# Patient Record
Sex: FEMALE | Race: OTHER | HISPANIC OR LATINO | Employment: UNEMPLOYED | URBAN - METROPOLITAN AREA
[De-identification: names, ages, dates, MRNs, and addresses within clinical notes are randomized per-mention and may not be internally consistent; named-entity substitution may affect disease eponyms.]

---

## 2020-11-23 ENCOUNTER — HOSPITAL ENCOUNTER (EMERGENCY)
Facility: HOSPITAL | Age: 45
Discharge: HOME | End: 2020-11-23
Attending: EMERGENCY MEDICINE
Payer: COMMERCIAL

## 2020-11-23 VITALS
OXYGEN SATURATION: 99 % | WEIGHT: 175 LBS | BODY MASS INDEX: 25.92 KG/M2 | HEIGHT: 69 IN | SYSTOLIC BLOOD PRESSURE: 138 MMHG | DIASTOLIC BLOOD PRESSURE: 79 MMHG | TEMPERATURE: 98.7 F | HEART RATE: 95 BPM | RESPIRATION RATE: 18 BRPM

## 2020-11-23 DIAGNOSIS — T78.40XA ALLERGIC REACTION, INITIAL ENCOUNTER: Primary | ICD-10-CM

## 2020-11-23 PROCEDURE — 99284 EMERGENCY DEPT VISIT MOD MDM: CPT | Performed by: PHYSICIAN ASSISTANT

## 2020-11-23 PROCEDURE — 99283 EMERGENCY DEPT VISIT LOW MDM: CPT

## 2020-11-23 RX ORDER — PREDNISONE 50 MG/1
50 TABLET ORAL DAILY
Qty: 4 TABLET | Refills: 0 | Status: SHIPPED | OUTPATIENT
Start: 2020-11-24 | End: 2020-11-28

## 2020-11-23 RX ORDER — DIPHENHYDRAMINE HCL 25 MG
50 TABLET ORAL ONCE
Status: COMPLETED | OUTPATIENT
Start: 2020-11-23 | End: 2020-11-23

## 2020-11-23 RX ORDER — FAMOTIDINE 20 MG/1
20 TABLET, FILM COATED ORAL ONCE
Status: COMPLETED | OUTPATIENT
Start: 2020-11-23 | End: 2020-11-23

## 2020-11-23 RX ADMIN — FAMOTIDINE 20 MG: 20 TABLET, FILM COATED ORAL at 11:12

## 2020-11-23 RX ADMIN — PREDNISONE 50 MG: 20 TABLET ORAL at 11:11

## 2020-11-23 RX ADMIN — DIPHENHYDRAMINE HCL 50 MG: 25 TABLET, COATED ORAL at 11:11

## 2024-04-03 ENCOUNTER — HOSPITAL ENCOUNTER (EMERGENCY)
Facility: HOSPITAL | Age: 49
Discharge: HOME | End: 2024-04-03
Attending: EMERGENCY MEDICINE
Payer: COMMERCIAL

## 2024-04-03 VITALS
DIASTOLIC BLOOD PRESSURE: 83 MMHG | RESPIRATION RATE: 20 BRPM | TEMPERATURE: 97.4 F | OXYGEN SATURATION: 98 % | HEART RATE: 68 BPM | SYSTOLIC BLOOD PRESSURE: 160 MMHG

## 2024-04-03 DIAGNOSIS — K05.10 GINGIVITIS: Primary | ICD-10-CM

## 2024-04-03 PROCEDURE — 99282 EMERGENCY DEPT VISIT SF MDM: CPT

## 2024-04-03 PROCEDURE — 99284 EMERGENCY DEPT VISIT MOD MDM: CPT | Performed by: EMERGENCY MEDICINE

## 2024-04-03 RX ORDER — PENICILLIN V POTASSIUM 500 MG/1
500 TABLET ORAL EVERY 6 HOURS SCHEDULED
Qty: 28 TABLET | Refills: 0 | Status: SHIPPED | OUTPATIENT
Start: 2024-04-03 | End: 2024-04-10

## 2024-04-03 RX ORDER — METHYLPREDNISOLONE 4 MG/1
TABLET ORAL
Qty: 21 TABLET | Refills: 0 | Status: SHIPPED | OUTPATIENT
Start: 2024-04-03

## 2024-04-03 RX ORDER — IBUPROFEN 800 MG/1
800 TABLET ORAL EVERY 6 HOURS PRN
Qty: 30 TABLET | Refills: 0 | Status: SHIPPED | OUTPATIENT
Start: 2024-04-03 | End: 2024-04-03

## 2024-04-03 RX ORDER — PENICILLIN V POTASSIUM 500 MG/1
500 TABLET ORAL EVERY 6 HOURS SCHEDULED
Qty: 28 TABLET | Refills: 0 | Status: SHIPPED | OUTPATIENT
Start: 2024-04-03 | End: 2024-04-03

## 2024-04-03 RX ORDER — METHYLPREDNISOLONE 4 MG/1
TABLET ORAL
Qty: 21 TABLET | Refills: 0 | Status: SHIPPED | OUTPATIENT
Start: 2024-04-03 | End: 2024-04-03

## 2024-04-03 RX ORDER — IBUPROFEN 800 MG/1
800 TABLET ORAL EVERY 6 HOURS PRN
Qty: 30 TABLET | Refills: 0 | Status: SHIPPED | OUTPATIENT
Start: 2024-04-03

## 2024-04-03 NOTE — ED PROVIDER NOTES
History  Chief Complaint   Patient presents with    Dental Pain     Left sided dental pain for the past 4 days. Unable to get in with dentist.      Patient is a 48-year-old female.  She presents to the emergency room with a 4-day history left sided gingival pain.  She has had this pain before and was told it might be lichen planus.  No facial swelling.  No fever or chills.  No difficulty breathing or swallowing.        None       History reviewed. No pertinent past medical history.    History reviewed. No pertinent surgical history.    History reviewed. No pertinent family history.  I have reviewed and agree with the history as documented.    E-Cigarette/Vaping     E-Cigarette/Vaping Substances     Social History     Tobacco Use    Smoking status: Never    Smokeless tobacco: Never   Substance Use Topics    Alcohol use: Not Currently    Drug use: Never       Review of Systems   Constitutional:  Negative for chills and fever.   HENT:  Negative for drooling, facial swelling, sore throat, trouble swallowing and voice change.    Respiratory:  Negative for shortness of breath and stridor.    All other systems reviewed and are negative.      Physical Exam  Physical Exam  Vitals reviewed.   Constitutional:       General: She is not in acute distress.  HENT:      Head: Normocephalic and atraumatic.      Nose: Nose normal.      Mouth/Throat:      Mouth: Mucous membranes are moist.      Comments: No dental tenderness.  There is tenderness to the gingiva.  I did not note any lesions to the mucosal surfaces.  Eyes:      General:         Right eye: No discharge.         Left eye: No discharge.      Conjunctiva/sclera: Conjunctivae normal.   Pulmonary:      Effort: Pulmonary effort is normal. No respiratory distress.   Musculoskeletal:         General: No deformity or signs of injury.      Cervical back: Normal range of motion and neck supple.   Skin:     General: Skin is warm and dry.      Findings: No rash.   Neurological:       General: No focal deficit present.      Mental Status: She is alert and oriented to person, place, and time.   Psychiatric:         Mood and Affect: Mood normal.         Behavior: Behavior normal.         Vital Signs  ED Triage Vitals [04/03/24 1110]   Temperature Pulse Respirations Blood Pressure SpO2   (!) 97.4 °F (36.3 °C) 68 20 160/83 98 %      Temp Source Heart Rate Source Patient Position - Orthostatic VS BP Location FiO2 (%)   Temporal Monitor Sitting Left arm --      Pain Score       --           Vitals:    04/03/24 1110   BP: 160/83   Pulse: 68   Patient Position - Orthostatic VS: Sitting         Visual Acuity      ED Medications  Medications - No data to display    Diagnostic Studies  Results Reviewed       None                   No orders to display              Procedures  Procedures         ED Course                                             Medical Decision Making  There is no Ludewig's angina or obvious dental abscess.  Suspect this is some sort of gingivitis or also possibly lichen planus.  Will treat with steroid and antibiotics.  Will provide analgesia.  Will recommend follow-up with oral surgery.    Risk  Prescription drug management.  Decision regarding hospitalization.             Disposition  Final diagnoses:   Gingivitis     Time reflects when diagnosis was documented in both MDM as applicable and the Disposition within this note       Time User Action Codes Description Comment    4/3/2024 11:39 AM Laurent Franco Add [K05.10] Gingivitis           ED Disposition       ED Disposition   Discharge    Condition   Stable    Date/Time   Wed Apr 3, 2024 11:38 AM    Comment   Ирина Franco discharge to home/self care.                   Follow-up Information       Follow up With Specialties Details Why Contact Info    [facility]  In 1 week  [address] [address]  [phone]            Patient's Medications    Discharge Prescriptions    IBUPROFEN (MOTRIN) 800 MG TABLET    Take 1 tablet (800 mg total) by mouth every 6 (six) hours as needed (pain)       Start Date: 4/3/2024  End Date: --       Order Dose: 800 mg       Quantity: 30 tablet    Refills: 0    METHYLPREDNISOLONE 4 MG TABLET THERAPY PACK    Use as directed on package       Start Date: 4/3/2024  End Date: --       Order Dose: --       Quantity: 21 tablet    Refills: 0    PENICILLIN V POTASSIUM (VEETID) 500 MG TABLET    Take 1 tablet (500 mg total) by mouth every 6 (six) hours for 7 days       Start Date: 4/3/2024  End Date: 4/10/2024       Order Dose: 500 mg       Quantity: 28 tablet    Refills: 0       No discharge procedures on file.    PDMP Review       None            ED Provider  Electronically Signed by             Laurent Franco MD  04/03/24 6287

## 2024-10-30 ENCOUNTER — HOSPITAL ENCOUNTER (EMERGENCY)
Facility: HOSPITAL | Age: 49
Discharge: HOME | End: 2024-10-30
Payer: COMMERCIAL

## 2024-10-30 VITALS
HEART RATE: 90 BPM | SYSTOLIC BLOOD PRESSURE: 134 MMHG | OXYGEN SATURATION: 99 % | TEMPERATURE: 98.7 F | DIASTOLIC BLOOD PRESSURE: 76 MMHG | RESPIRATION RATE: 20 BRPM

## 2024-10-30 DIAGNOSIS — J02.9 ACUTE PHARYNGITIS: ICD-10-CM

## 2024-10-30 DIAGNOSIS — J01.90 ACUTE RHINOSINUSITIS: Primary | ICD-10-CM

## 2024-10-30 PROCEDURE — 99282 EMERGENCY DEPT VISIT SF MDM: CPT

## 2024-10-30 PROCEDURE — 99284 EMERGENCY DEPT VISIT MOD MDM: CPT | Performed by: PHYSICIAN ASSISTANT

## 2024-10-30 RX ORDER — BROMPHENIRAMINE MALEATE, PSEUDOEPHEDRINE HYDROCHLORIDE, AND DEXTROMETHORPHAN HYDROBROMIDE 2; 30; 10 MG/5ML; MG/5ML; MG/5ML
5 SYRUP ORAL 4 TIMES DAILY PRN
Qty: 120 ML | Refills: 0 | Status: SHIPPED | OUTPATIENT
Start: 2024-10-30

## 2024-10-30 RX ORDER — IBUPROFEN 400 MG/1
400 TABLET, FILM COATED ORAL EVERY 6 HOURS PRN
Qty: 30 TABLET | Refills: 0 | Status: SHIPPED | OUTPATIENT
Start: 2024-10-30

## 2024-10-30 NOTE — ED PROVIDER NOTES
"Time reflects when diagnosis was documented in both MDM as applicable and the Disposition within this note       Time User Action Codes Description Comment    10/30/2024 10:36 AM Nolberto Dash Add [J01.90] Acute rhinosinusitis     10/30/2024 10:36 AM Nolberto Dash [J02.9] Acute pharyngitis           ED Disposition       ED Disposition   Discharge    Condition   Stable    Date/Time   Wed Oct 30, 2024 10:36 AM    Comment   Ирина A Salvador discharge to home/self care.                   Assessment & Plan       Medical Decision Making  Medical Decision Makin y.o. female presents to ED for evaluation of sinus pressure, headache and congestion.     Ddx:  ddx includes but is not limited to viral syndrome, sinusitis, tension headache, migraine headache, cluster headache, consider but doubt SAH, SDH, temporal arteritis due to lack of unilateral temporal location of pain, or meningitis due to lack of fever/nuchal rigidity.    Plan.  Offered viral screening but declined,  discussed options with patient who reports she came in today to get treatment for clinical symptoms.     On re-evaluation: well appearing in NAD, vital signs are normal. A&O x 3, airway patent, no chest pain or respiratory distress, Abdomen non distended, non tender. M/S no gross deformity, LIZARRAGA x 4,GCS 15     Final Evaluation:  (see ED course for additional MDM)  Acute rhino sinusitis  Acute pharyngitis    Will treat for sinusitis and presumptive group A strep.  Patient does not exhibit any unusual or severe clinical findings such as secretions, drooling, dysphonia, muffled \"hot potato\" voice, difficulty breathing, stridor or neck swelling that would suggest more severe parapharyngeal space infections.     -Stable for discharge and outpatient management.   -Reviewed diagnosis, treatment plan, and expectant coarse  -Verbal and written instructions given to follow up with pcp and recommended specialist    -Discussed reasons to Return to ED.  " Patient verbalized understanding of reasons return to the ED.   -Opportunity provided for questions and all questions were answered.          Risk  Prescription drug management.             Medications - No data to display    ED Risk Strat Scores                                               History of Present Illness       Chief Complaint   Patient presents with    Headache     Pt presents with c/o HA since Sunday, taking OTC tylenol witn no relief. Reports a previous history but has not had one since 2016.        History reviewed. No pertinent past medical history.   History reviewed. No pertinent surgical history.   History reviewed. No pertinent family history.   Social History     Tobacco Use    Smoking status: Never    Smokeless tobacco: Never   Substance Use Topics    Alcohol use: Not Currently    Drug use: Never      E-Cigarette/Vaping      E-Cigarette/Vaping Substances      I have reviewed and agree with the history as documented.     48-year-old female with no significant past medical history presents to the emergency department for evaluation of headache associated with nasal congestion, sinus pressure, sore throat and cough that started 3 days ago.  Denies recent sick contacts or travel outside the country.  Describes headache as diffuse, pressure and waxing and waning for the past 3 days.  Denies fall injury or trauma.  Reports no blood thinner use.  No history of migraine headaches.  Non-smoker.  Denies any facial droop fevers, neck pain, rash, chest pain, shortness of breath, nausea, vomiting, extremity weakness or syncope.  Has been taking over-the-counter Tylenol without relief of symptoms.      History provided by:  Patient   used: No    Headache  Associated symptoms: congestion, drainage, sinus pressure and sore throat    Associated symptoms: no abdominal pain, no diarrhea, no fever, no nausea, no neck pain, no neck stiffness, no numbness, no seizures, no vomiting and no  weakness        Review of Systems   Constitutional:  Negative for chills, diaphoresis and fever.   HENT:  Positive for congestion, postnasal drip, rhinorrhea, sinus pressure and sore throat. Negative for facial swelling, mouth sores, trouble swallowing and voice change.    Respiratory:  Negative for chest tightness, shortness of breath and stridor.    Cardiovascular:  Negative for chest pain.   Gastrointestinal:  Negative for abdominal pain, diarrhea, nausea and vomiting.   Musculoskeletal:  Negative for gait problem, neck pain and neck stiffness.   Skin:  Negative for rash.   Neurological:  Positive for headaches. Negative for seizures, syncope, facial asymmetry, weakness and numbness.   All other systems reviewed and are negative.          Objective       ED Triage Vitals [10/30/24 1004]   Temperature Pulse Blood Pressure Respirations SpO2 Patient Position - Orthostatic VS   98.7 °F (37.1 °C) 90 134/76 20 99 % Sitting      Temp src Heart Rate Source BP Location FiO2 (%) Pain Score    -- -- Left arm -- --      Vitals      Date and Time Temp Pulse SpO2 Resp BP Pain Score FACES Pain Rating User   10/30/24 1004 98.7 °F (37.1 °C) 90 99 % 20 134/76 -- -- AS            Physical Exam  Vitals and nursing note reviewed.   Constitutional:       General: She is not in acute distress.     Appearance: Normal appearance.   HENT:      Head: Normocephalic and atraumatic.      Right Ear: Tympanic membrane and external ear normal.      Left Ear: Tympanic membrane and external ear normal.      Nose: Congestion present.      Mouth/Throat:      Mouth: Mucous membranes are moist.      Pharynx: Posterior oropharyngeal erythema present. No oropharyngeal exudate.      Comments: Posterior pharynx injected and erythematous.  Uvula midline without deviation.  Tonsils erythematous without purulent exudate present.  No drooling.  No trismus. Mucous membranes moist and pink with no clinical signs of dehydration.  No lip/tongue/facial swelling.   No submandibular or sublingual fullness or swelling.     Eyes:      General: No scleral icterus.        Right eye: No discharge.         Left eye: No discharge.      Extraocular Movements: Extraocular movements intact.      Conjunctiva/sclera: Conjunctivae normal.   Cardiovascular:      Rate and Rhythm: Normal rate.      Pulses: Normal pulses.   Pulmonary:      Effort: Pulmonary effort is normal.      Breath sounds: Normal breath sounds.   Musculoskeletal:         General: No tenderness, deformity or signs of injury. Normal range of motion.      Cervical back: Normal range of motion and neck supple. No rigidity or tenderness.   Lymphadenopathy:      Cervical: No cervical adenopathy.   Skin:     General: Skin is dry.      Coloration: Skin is not jaundiced.      Findings: No erythema or rash.   Neurological:      General: No focal deficit present.      Mental Status: She is alert and oriented to person, place, and time. Mental status is at baseline.      Cranial Nerves: No cranial nerve deficit.      Sensory: No sensory deficit.      Motor: No weakness.      Coordination: Coordination normal.   Psychiatric:         Mood and Affect: Mood normal.         Behavior: Behavior normal.         Thought Content: Thought content normal.         Results Reviewed       None            No orders to display       Procedures    ED Medication and Procedure Management   Prior to Admission Medications   Prescriptions Last Dose Informant Patient Reported? Taking?   ibuprofen (MOTRIN) 800 mg tablet   No No   Sig: Take 1 tablet (800 mg total) by mouth every 6 (six) hours as needed (pain)   methylPREDNISolone 4 MG tablet therapy pack   No No   Sig: Use as directed on package      Facility-Administered Medications: None     Discharge Medication List as of 10/30/2024 10:38 AM        START taking these medications    Details   amoxicillin-clavulanate (AUGMENTIN) 875-125 mg per tablet Take 1 tablet by mouth every 12 (twelve) hours for 10 days,  Starting Wed 10/30/2024, Until Sat 11/9/2024, Normal      brompheniramine-pseudoephedrine-DM 30-2-10 MG/5ML syrup Take 5 mL by mouth 4 (four) times a day as needed for cough or congestion, Starting Wed 10/30/2024, Normal      !! ibuprofen (MOTRIN) 400 mg tablet Take 1 tablet (400 mg total) by mouth every 6 (six) hours as needed for moderate pain or headaches, Starting Wed 10/30/2024, Normal       !! - Potential duplicate medications found. Please discuss with provider.        CONTINUE these medications which have NOT CHANGED    Details   !! ibuprofen (MOTRIN) 800 mg tablet Take 1 tablet (800 mg total) by mouth every 6 (six) hours as needed (pain), Starting Wed 4/3/2024, Normal      methylPREDNISolone 4 MG tablet therapy pack Use as directed on package, Normal       !! - Potential duplicate medications found. Please discuss with provider.        No discharge procedures on file.  ED SEPSIS DOCUMENTATION   Time reflects when diagnosis was documented in both MDM as applicable and the Disposition within this note       Time User Action Codes Description Comment    10/30/2024 10:36 AM Nolberto Dash [J01.90] Acute rhinosinusitis     10/30/2024 10:36 AM Nolberto Dash [J02.9] Acute pharyngitis                  Nolberto Dash PA-C  10/30/24 3047

## 2024-10-30 NOTE — Clinical Note
Ирина Franco was seen and treated in our emergency department on 10/30/2024.    No restrictions            Diagnosis:     Ирина  may return to work on return date.    She may return on this date: 11/04/2024         If you have any questions or concerns, please don't hesitate to call.      Nolberto Dash PA-C    ______________________________           _______________          _______________  Hospital Representative                              Date                                Time